# Patient Record
Sex: FEMALE | Race: BLACK OR AFRICAN AMERICAN
[De-identification: names, ages, dates, MRNs, and addresses within clinical notes are randomized per-mention and may not be internally consistent; named-entity substitution may affect disease eponyms.]

---

## 2017-05-20 ENCOUNTER — HOSPITAL ENCOUNTER (EMERGENCY)
Dept: HOSPITAL 74 - JER | Age: 62
Discharge: HOME | End: 2017-05-20
Payer: COMMERCIAL

## 2017-05-20 VITALS — TEMPERATURE: 97.6 F | HEART RATE: 64 BPM | DIASTOLIC BLOOD PRESSURE: 70 MMHG | SYSTOLIC BLOOD PRESSURE: 153 MMHG

## 2017-05-20 VITALS — BODY MASS INDEX: 37.5 KG/M2

## 2017-05-20 DIAGNOSIS — E78.00: ICD-10-CM

## 2017-05-20 DIAGNOSIS — Z87.442: ICD-10-CM

## 2017-05-20 DIAGNOSIS — I10: ICD-10-CM

## 2017-05-20 DIAGNOSIS — R10.31: Primary | ICD-10-CM

## 2017-05-20 LAB
ANION GAP SERPL CALC-SCNC: 10 MMOL/L (ref 8–16)
APPEARANCE UR: CLEAR
BASOPHILS # BLD: 0.7 % (ref 0–2)
BILIRUB UR STRIP.AUTO-MCNC: NEGATIVE MG/DL
CALCIUM SERPL-MCNC: 9.4 MG/DL (ref 8.5–10.1)
CO2 SERPL-SCNC: 32 MMOL/L (ref 21–32)
COCKROFT - GAULT: 123.21
COLOR UR: (no result)
CREAT SERPL-MCNC: 0.8 MG/DL (ref 0.55–1.02)
DEPRECATED RDW RBC AUTO: 16 % (ref 11.6–15.6)
EOSINOPHIL # BLD: 2.3 % (ref 0–4.5)
GLUCOSE SERPL-MCNC: 84 MG/DL (ref 74–106)
KETONES UR QL STRIP: (no result)
LEUKOCYTE ESTERASE UR QL STRIP.AUTO: NEGATIVE
MAGNESIUM SERPL-MCNC: 2.2 MG/DL (ref 1.8–2.4)
MCH RBC QN AUTO: 25.2 PG (ref 25.7–33.7)
MCHC RBC AUTO-ENTMCNC: 32.3 G/DL (ref 32–36)
MCV RBC: 78.3 FL (ref 80–96)
NEUTROPHILS # BLD: 53.9 % (ref 42.8–82.8)
NITRITE UR QL STRIP: NEGATIVE
PH UR: 7 [PH] (ref 5–8)
PHOSPHATE SERPL-MCNC: 2.6 MG/DL (ref 2.5–4.9)
PLATELET # BLD AUTO: 313 K/MM3 (ref 134–434)
PMV BLD: 7 FL (ref 7.5–11.1)
PROT UR QL STRIP: NEGATIVE
PROT UR QL STRIP: NEGATIVE
RBC # BLD AUTO: 2 /HPF (ref 0–3)
RBC # UR STRIP: (no result) /UL
SP GR UR: 1.01 (ref 1–1.02)
UROBILINOGEN UR STRIP-MCNC: NEGATIVE E.U./DL (ref 0.2–1)
WBC # BLD AUTO: 7 K/MM3 (ref 4–10)
WBC # UR AUTO: <1 /HPF (ref 3–5)

## 2017-05-20 PROCEDURE — 3E0333Z INTRODUCTION OF ANTI-INFLAMMATORY INTO PERIPHERAL VEIN, PERCUTANEOUS APPROACH: ICD-10-PCS | Performed by: EMERGENCY MEDICINE

## 2017-05-20 NOTE — PDOC
History of Present Illness





- General


History Source: Patient


Exam Limitations: No Limitations





- History of Present Illness


Initial Comments: 





05/20/17 14:07


Patient is a 61 year old female with a significant past medical history of 

kidney stones, hypertension and hyperlipidemia who presents to the ED with 

right flank pain since yesterday. Patient states that the pain initially 

started with diffuse back pain with sudden onset and today it is localized at 

the right flank and that is constant. Patient describes the pain as sharp while 

she is ambulating and dull while sitting down. She reports nausea. 


She denies any vomiting, diarrhea, constipation, hematuria, dysuria or 

abdominal pain. 


Patient denies any recent fall or trauma. 














<Perla Fry - Last Filed: 05/20/17 14:07>





- General


History Source: Patient, Old Records


Exam Limitations: No Limitations





<Mary Davenport - Last Filed: 05/20/17 16:30>





- General


Chief Complaint: Pain


Stated Complaint: BACK/ RT SIDE PAIN


Time Seen by Provider: 05/20/17 13:36





Past History





<Perla Fry - Last Filed: 05/20/17 14:07>





- Past Medical History


Anemia: No


Asthma: No


Cancer: No


Cardiac Disorders: No


CVA: No


COPD: No


CHF: No


Dementia: No


Diabetes: No


GI Disorders: No


 Disorders: No


HTN: Yes


Hypercholesterolemia: Yes


Liver Disease: No


Seizures: No


Thyroid Disease: No





- Surgical History


Abdominal Surgery: No


Appendectomy: No


Cardiac Surgery: No


Cholecystectomy: Yes


Lung Surgery: No


Neurologic Surgery: No


Orthopedic Surgery: Yes (FX LEFT ANKLE)





- Psycho/Social/Smoking Cessation Hx


Anxiety: No


Suicidal Ideation: No


Smoking History: Never smoked


Have you smoked in the past 12 months: No


Information on smoking cessation initiated: No


Hx Alcohol Use: No


Drug/Substance Use Hx: No


Substance Use Type: None


Hx Substance Use Treatment: No





<Mary Davenport - Last Filed: 05/20/17 16:30>





- Past Medical History


Allergies/Adverse Reactions: 


 Allergies











Allergy/AdvReac Type Severity Reaction Status Date / Time


 


latex AdvReac  Itching Verified 05/20/17 12:40











Home Medications: 


Ambulatory Orders





Amlodipine Besylate [Norvasc -] 10 mg PO DAILY 08/12/14 


Valsartan/Hydrochlorothiazide [Diovan Hct 160-25 mg Tablet] 1 each PO DAILY 08/ 12/14 











**Review of Systems





- Review of Systems


Able to Perform ROS?: Yes


Comments:: 





05/20/17 14:07


GENERAL/CONSTITUTIONAL: No fever or chills. No weakness.


HEAD, EYES, EARS, NOSE AND THROAT: No change in vision. No ear pain or 

discharge. No sore throat.


CARDIOVASCULAR: No chest pain or shortness of breath.


RESPIRATORY: No cough, wheezing, or hemoptysis.


GASTROINTESTINAL: No nausea, vomiting, diarrhea or constipation.


GENITOURINARY: No dysuria, frequency, or change in urination.


MUSCULOSKELETAL: (+)right flank pain No joint or muscle swelling or pain. No 

neck.


SKIN: No rash


NEUROLOGIC: No headache, vertigo, loss of consciousness, or change in strength/

sensation.


ENDOCRINE: No increased thirst. No abnormal weight change.


HEMATOLOGIC/LYMPHATIC: No anemia, easy bleeding, or history of blood clots.


ALLERGIC/IMMUNOLOGIC: No hives or skin allergy.





<Perla Fry - Last Filed: 05/20/17 14:07>





*Physical Exam





- Vital Signs


 Last Vital Signs











Temp Pulse Resp BP Pulse Ox


 


 97.6 F   64   18   153/70   99 


 


 05/20/17 12:40  05/20/17 12:40  05/20/17 12:40  05/20/17 12:40  05/20/17 12:40














- Physical Exam


Comments: 





05/20/17 14:10


GENERAL: Awake, alert, and fully oriented, in no acute distress


HEAD: No signs of trauma


EYES: PERRLA, EOMI, sclera anicteric, conjunctiva clear


ENT: Auricles normal inspection, hearing grossly normal, nares patent, 

oropharynx clear without exudates. Moist mucosa


NECK: Normal ROM, supple, no lymphadenopathy, JVD, or masses


LUNGS: Breath sounds equal, clear to auscultation bilaterally.  No wheezes, and 

no crackles


HEART: Regular rate and rhythm, normal S1 and S2, no murmurs, rubs or gallops


ABDOMEN: Soft, nontender, normoactive bowel sounds.  No guarding, no rebound.  

No masses


EXTREMITIES: Normal range of motion, no edema.  No clubbing or cyanosis. No 

cords, erythema, or tenderness


NEUROLOGICAL: Cranial nerves II through XII grossly intact.  Normal speech, 

normal gait


SKIN: Warm, Dry, normal turgor, no rashes or lesions noted.








<Perla Fry - Last Filed: 05/20/17 14:07>





- Vital Signs


 Last Vital Signs











Temp Pulse Resp BP Pulse Ox


 


 97.6 F   64   18   153/70   99 


 


 05/20/17 12:40  05/20/17 12:40  05/20/17 12:40  05/20/17 12:40  05/20/17 12:40














<Mary Davenport - Last Filed: 05/20/17 16:30>





ED Treatment Course





- LABORATORY


CBC & Chemistry Diagram: 


 05/20/17 15:00





 05/20/17 15:00





<Mary Davenport - Last Filed: 05/20/17 16:30>





Medical Decision Making





- Medical Decision Making


05/20/17 13:54


61-year-old female with history of hypertension and nephrolithiasis presents to 

the emergency Department with complaints of right flank pain since yesterday 

accompanied with nausea but no vomiting. Differential diagnosis includes but is 

not limited to: Pyelonephritis, nephrolithiasis/renal colic, musculoskeletal 

pain, UTI.


Plan:


1. Labs and urine analysis


2. Pain management


3. IV fluids for hydration


4. CT scan of abdomen and pelvis without contrast


5. Observe and reevaluate





05/20/17 16:23


Addendum: Labs are reviewed and are noted in the EMR. The urine analysis is 

negative. CT scan of the abdomen and pelvis shows no acute intra-abdominal 

pathology or pelvic pathology as well as no stone. I reevaluated the patient at 

this time and she is feeling improved. We'll discharge home, follow up with 

primary care physician and return to the emergency department if symptoms 

persist, worsen, or new symptoms arise.





<Mary Davenport - Last Filed: 05/20/17 16:30>





*DC/Admit/Observation/Transfer





- Attestations


Scribe Attestion: 





05/20/17 14:11





Documentation prepared by ALBER Akers, acting as medical scribe for 

Mary Davenport MD.





<Perla Fry - Last Filed: 05/20/17 14:07>





- Discharge Dispostion


Admit: No





- Attestations


Physician Attestion: 





05/20/17 13:55


I, Dr. Mary Murano, attest that the scribes documentation that appears above 

has been prepared under my direction and personally reviewed by me in its 

entirety.





I confirmed that the note above accurately reflects all work, treatment, 

procedures, and medical decision-making performed by me.





<Mary Davenport - Last Filed: 05/20/17 16:30>


Diagnosis at time of Disposition: 


 Right flank pain





- Discharge Dispostion


Disposition: HOME


Condition at time of disposition: Stable





- Referrals


Referrals: 


STAFF,NOT ON [Primary Care Provider] - 





- Patient Instructions


Printed Discharge Instructions:  DI for Low Back Pain


Additional Instructions: 


You may take acetaminophen or ibuprofen for your back pain. Please follow-up 

with your primary care physician within the next 3-5 days and return to the 

emergency department if your symptoms persist, worsen, or new symptoms arise.

## 2017-05-22 ENCOUNTER — HOSPITAL ENCOUNTER (EMERGENCY)
Dept: HOSPITAL 74 - JER | Age: 62
Discharge: HOME | End: 2017-05-22
Payer: COMMERCIAL

## 2017-05-22 VITALS — SYSTOLIC BLOOD PRESSURE: 126 MMHG | DIASTOLIC BLOOD PRESSURE: 72 MMHG | HEART RATE: 58 BPM

## 2017-05-22 VITALS — BODY MASS INDEX: 37.5 KG/M2

## 2017-05-22 VITALS — TEMPERATURE: 98.1 F

## 2017-05-22 DIAGNOSIS — I10: ICD-10-CM

## 2017-05-22 DIAGNOSIS — N30.00: ICD-10-CM

## 2017-05-22 DIAGNOSIS — M94.0: Primary | ICD-10-CM

## 2017-05-22 DIAGNOSIS — E78.00: ICD-10-CM

## 2017-05-22 LAB
ALBUMIN SERPL-MCNC: 4.1 G/DL (ref 3.4–5)
ALP SERPL-CCNC: 94 U/L (ref 45–117)
ALT SERPL-CCNC: 18 U/L (ref 12–78)
ANION GAP SERPL CALC-SCNC: 7 MMOL/L (ref 8–16)
APPEARANCE UR: CLEAR
AST SERPL-CCNC: 17 U/L (ref 15–37)
BACTERIA #/AREA URNS HPF: (no result) /HPF
BASOPHILS # BLD: 0.3 % (ref 0–2)
BILIRUB SERPL-MCNC: 0.4 MG/DL (ref 0.2–1)
BILIRUB UR STRIP.AUTO-MCNC: NEGATIVE MG/DL
CALCIUM SERPL-MCNC: 9.8 MG/DL (ref 8.5–10.1)
CO2 SERPL-SCNC: 32 MMOL/L (ref 21–32)
COCKROFT - GAULT: 98.57
COLOR UR: YELLOW
CREAT SERPL-MCNC: 1 MG/DL (ref 0.55–1.02)
DEPRECATED RDW RBC AUTO: 16.5 % (ref 11.6–15.6)
EOSINOPHIL # BLD: 2.5 % (ref 0–4.5)
GLUCOSE SERPL-MCNC: 95 MG/DL (ref 74–106)
KETONES UR QL STRIP: NEGATIVE
LEUKOCYTE ESTERASE UR QL STRIP.AUTO: (no result)
MCH RBC QN AUTO: 25.1 PG (ref 25.7–33.7)
MCHC RBC AUTO-ENTMCNC: 31.9 G/DL (ref 32–36)
MCV RBC: 78.7 FL (ref 80–96)
MUCOUS THREADS URNS QL MICRO: (no result)
NEUTROPHILS # BLD: 56.9 % (ref 42.8–82.8)
NITRITE UR QL STRIP: NEGATIVE
PH UR: 5 [PH] (ref 5–8)
PLATELET # BLD AUTO: 322 K/MM3 (ref 134–434)
PMV BLD: 6.8 FL (ref 7.5–11.1)
PROT SERPL-MCNC: 8.2 G/DL (ref 6.4–8.2)
PROT UR QL STRIP: NEGATIVE
PROT UR QL STRIP: NEGATIVE
RBC # BLD AUTO: 3 /HPF (ref 0–3)
RBC # UR STRIP: (no result) /UL
SP GR UR: 1.02 (ref 1–1.02)
UROBILINOGEN UR STRIP-MCNC: NEGATIVE E.U./DL (ref 0.2–1)
WBC # BLD AUTO: 6.3 K/MM3 (ref 4–10)
WBC # UR AUTO: 13 /HPF (ref 3–5)

## 2017-05-22 NOTE — PDOC
History of Present Illness





- General


Chief Complaint: Pain


Stated Complaint: RT SIDE PAIN


Time Seen by Provider: 05/22/17 12:00


History Source: Patient


Exam Limitations: No Limitations





- History of Present Illness


Initial Comments: 


CHIEF COMPLAINT:  62 y/o afebrile female with PMH HTN, HLD c/o right upper back 

pain for the past 2 days. 





HISTORY OF PRESENT ILLNESS:  The patient was seen here 2 days ago for the same 

pain, had labs/UA/CT scan performed and all were normal.  She was discharged 

home with dx of musculoskeletal pain and instructed to take tylenol at home.  

The patient states the pain has gotten worse and is now constant and she is 

also nauseous.  She denies f/c, v/d, CP, SOB, abd pain, flank pain, hematuria, 

dysuria. 





Vital signs on arrival are notable for pulse of 52.





REVIEW OF SYSTEMS:


GENERAL/CONSTITUTIONAL: No fever/chills. No weakness. No weight change.


HEAD, EYES, EARS, NOSE AND THROAT: No change in vision. No ear pain or 

discharge. No sore throat.


CARDIOVASCULAR: No chest pain or shortness of breath.


RESPIRATORY: No cough, wheezing, or hemoptysis.


GASTROINTESTINAL: +nausea.  No vomiting, diarrhea, abd pain.


GENITOURINARY: No dysuria, frequency, or change in urination.


MUSCULOSKELETAL: No joint or muscle swelling or pain. No neck or back pain.


SKIN: No rash or easy bruising.


NEUROLOGIC: No headache, vertigo, loss of consciousness, or loss of sensation.





PHYSICAL EXAM:


GENERAL: The patient is awake, alert, and fully oriented, in no acute distress.

  She is ambulatory with a cane. 


HEAD: Normal with no signs of trauma.


ENT: Pupils equal, round and reactive to light, extraocular movements intact, 

sclera anicteric, conjunctiva clear. Neck supple.


LUNGS: Clear to auscultation bilaterally. Normal excursion. No respiratory 

distress or use of accessory muscles.


CHEST WALL:  Tenderness to palpation of right floating ribs.


CV: RRR, S1/S2, no MRG. Cap refill < 2 sec.


ABDOMEN: Soft, non-distended, non-tender even to deep palpation, no 

hepatomegaly or splenomegaly, no masses.


BACK:  Exquisite right CVA tenderness with fist percussion.  No left CVA ttp. 


EXTREMITIES: Normal range of motion, no edema.


NEUROLOGICAL: Normal speech, normal gait. CN II-XII grossly intact.


PSYCH: Normal mood, normal affect.


SKIN: Warm, dry, normal turgor, no rashes or lesions noted.














Past History





- Past Medical History


Allergies/Adverse Reactions: 


 Allergies











Allergy/AdvReac Type Severity Reaction Status Date / Time


 


latex AdvReac  Itching Verified 05/22/17 10:42











Home Medications: 


Ambulatory Orders





Amlodipine Besylate [Norvasc -] 10 mg PO DAILY 08/12/14 


Valsartan/Hydrochlorothiazide [Diovan Hct 160-25 mg Tablet] 1 each PO DAILY 08/ 12/14 


Cephalexin Monohydrate [Keflex -] 500 mg PO BID #10 capsule 05/22/17 


Tramadol HCl 50 mg PO Q6H #20 tablet MDD 5 05/22/17 








Anemia: No


Asthma: No


Cancer: No


Cardiac Disorders: No


CVA: No


COPD: No


CHF: No


Dementia: No


Diabetes: No


GI Disorders: No


 Disorders: No


HTN: Yes


Hypercholesterolemia: Yes


Liver Disease: No


Seizures: No


Thyroid Disease: No





- Surgical History


Abdominal Surgery: No


Appendectomy: No


Cardiac Surgery: No


Cholecystectomy: Yes


Lung Surgery: No


Neurologic Surgery: No


Orthopedic Surgery: Yes (FX LEFT ANKLE)





- Psycho/Social/Smoking Cessation Hx


Anxiety: No


Suicidal Ideation: No


Smoking History: Never smoked


Have you smoked in the past 12 months: No


Information on smoking cessation initiated: No


Hx Alcohol Use: No


Drug/Substance Use Hx: No


Substance Use Type: None


Hx Substance Use Treatment: No





*Physical Exam





- Vital Signs


 Last Vital Signs











Temp Pulse Resp BP Pulse Ox


 


 98.1 F   52 L  18   129/76   100 


 


 05/22/17 10:42  05/22/17 10:42  05/22/17 10:42  05/22/17 10:42  05/22/17 10:42














ED Treatment Course





- LABORATORY


CBC & Chemistry Diagram: 


 05/22/17 12:10





 05/22/17 12:10





Medical Decision Making





- Medical Decision Making


A/P:  62 y/o afebrile female with possible pyelo.  Plan is as follows:





1. Labs


2. UA/culture


3. Kidney/renal ultrasound


4. IM toradol








Kidney/renal ultrasound


IMPRESSION:  No evidence of hydronephrosis or acute kidney issue.





Gave patient all of her results.  At this point most likely costochondral pain 

and UTI.  Will send home with rx for Keflex and Tramadol.  INstructed the 

patient to take advil along with the tramadol, drink plenty of fluids and 

return to the ER in 48-72 hours if no improvement in symptoms. 





The patient verbalizes understanding of all instructions, has no further 

questions and is awaiting discharge.








*DC/Admit/Observation/Transfer


Diagnosis at time of Disposition: 


 Costochondral pain





UTI (urinary tract infection)


Qualifiers:


 Urinary tract infection type: acute cystitis Hematuria presence: without 

hematuria Qualified Code(s): N30.00 - Acute cystitis without hematuria





- Discharge Dispostion


Disposition: HOME


Condition at time of disposition: Stable





- Prescriptions


Prescriptions: 


Cephalexin Monohydrate [Keflex -] 500 mg PO BID #10 capsule


Tramadol HCl 50 mg PO Q6H #20 tablet MDD 5





- Referrals


Referrals: 


STAFF,NOT ON [Primary Care Provider] - 


Silvino Najera MD [Staff Physician] - 





- Patient Instructions


Printed Discharge Instructions:  DI for Costochondritis, DI for Urinary Tract 

Infection (UTI)


Additional Instructions: 


Discharge Instructions:


-Please take 2 prescriptions as prescribed; Tramadol may cause drowsiness


-Take Tramadol along with Motrin every 6 hours for pain


-Drink plenty of fluids


-Follow up with your doctor or Dr. Najera within 2 weeks


-Return to the ER with any worsening or concerning symptom





- Post Discharge Activity


Work/School Note:  Back to Work

## 2017-05-22 NOTE — PDOC
*Physical Exam





- Vital Signs


 Last Vital Signs











Temp Pulse Resp BP Pulse Ox


 


 98.1 F   52 L  18   129/76   100 


 


 05/22/17 10:42  05/22/17 10:42  05/22/17 10:42  05/22/17 10:42  05/22/17 10:42














- Physical Exam


Comments: 





05/22/17 14:19


Vital signs normal.


Overall well-appearing, speaking full sentences


Exam is normal except for isolated and reproducible tenderness along the right 

lower floating ribs, most prominent near the cartilage, no overlying rash or 

bruising or swelling


Lungs are clear, no CVA tenderness, abdomen is benign





ED Treatment Course





- LABORATORY


CBC & Chemistry Diagram: 


 05/22/17 12:10





 05/22/17 12:10





- ADDITIONAL ORDERS


Additional order review: 


 Laboratory  Results











  05/22/17 05/22/17





  12:10 12:10


 


Sodium  141 


 


Potassium  3.5 


 


Chloride  102 


 


Carbon Dioxide  32 


 


Anion Gap  7 L 


 


BUN  18  D 


 


Creatinine  1.0  D 


 


Creat Clearance w eGFR  56.37 


 


Random Glucose  95 


 


Calcium  9.8 


 


Total Bilirubin  0.4  D 


 


AST  17 


 


ALT  18 


 


Alkaline Phosphatase  94 


 


Total Protein  8.2  D 


 


Albumin  4.1  D 


 


Urine Color   Yellow


 


Urine Appearance   Clear


 


Urine pH   5.0  D


 


Urine Protein   Negative


 


Urine Glucose (UA)   Negative


 


Urine Ketones   Negative


 


Urine Blood   1+ H


 


Urine Nitrite   Negative


 


Urine Bilirubin   Negative


 


Urine Urobilinogen   Negative


 


Ur Leukocyte Esterase   1+ H


 


Urine RBC   3


 


Urine WBC   13


 


Ur Epithelial Cells   Rare


 


Urine Bacteria   Moderate


 


Urine Mucus   Few








 











  05/22/17





  12:10


 


RBC  4.81


 


MCV  78.7 L


 


MCHC  31.9 L


 


RDW  16.5 H


 


MPV  6.8 L


 


Neutrophils %  56.9


 


Lymphocytes %  34.5


 


Monocytes %  5.8


 


Eosinophils %  2.5


 


Basophils %  0.3














- Medications


Given in the ED: 


ED Medications














Discontinued Medications














Generic Name Dose Route Start Last Admin





  Trade Name Freq  PRN Reason Stop Dose Admin


 


Ketorolac Tromethamine  60 mg 05/22/17 12:08 05/22/17 12:31





  Toradol Injection -  IVPUSH 05/22/17 12:09  60 mg





  ONCE ONE   Administration














Medical Decision Making





- Medical Decision Making





05/22/17 14:19


Patient seen and evaluated with the nurse practitioner. I agree with the 

overall evaluation, assessment, and management with the following summary of 

visit:





61-year-old female with no severe past medical history presents for second 

visit in 3 days for persistent right sided pain. Prior workup with labs and 

urinalysis and CAT scan showed no acute pathology, workup today showed normal 

labs, normal renal ultrasound, but elevated white blood cells on the urinalysis.





Overall her presentation is not consistent with UTI or pyelonephritis, but 

given the elevated white count on the urinalysis will treat empirically as it 

pyelonephritis. In the absence of fever and leukocytosis, chemistries treated 

with outpatient trial. Clinically, the patient has costochondral pain that 

seems more musculoskeletal, Will trial course of tramadol with strict return 

precautions. She has no risk factors for PE, no signs or symptoms of PE, 

clinically rules out.





*DC/Admit/Observation/Transfer


Diagnosis at time of Disposition: 


 Costochondral pain





UTI (urinary tract infection)


Qualifiers:


 Urinary tract infection type: acute cystitis Hematuria presence: without 

hematuria Qualified Code(s): N30.00 - Acute cystitis without hematuria





- Discharge Dispostion


Disposition: HOME


Condition at time of disposition: Stable





- Prescriptions


Prescriptions: 


Cephalexin Monohydrate [Keflex -] 500 mg PO BID #10 capsule


Tramadol HCl 50 mg PO Q6H PRN #20 tablet MDD 4


 PRN Reason: Pain





- Referrals


Referrals: 


STAFF,NOT ON [Primary Care Provider] - 


Silvino Najera MD [Staff Physician] - 





- Patient Instructions


Printed Discharge Instructions:  DI for Urinary Tract Infection (UTI), DI for 

Costochondritis


Additional Instructions: 


Discharge Instructions:


-Please take 2 prescriptions as prescribed; Tramadol may cause drowsiness


-Take Tramadol along with Motrin every 6 hours for pain


-Drink plenty of fluids


-Follow up with your doctor or Dr. Najera within 2 weeks


-Return to the ER with any worsening or concerning symptom





- Post Discharge Activity


Work/School Note:  Back to Work

## 2018-01-17 ENCOUNTER — HOSPITAL ENCOUNTER (OUTPATIENT)
Dept: HOSPITAL 74 - JASU-SURG | Age: 63
Discharge: HOME | End: 2018-01-17
Attending: ORTHOPAEDIC SURGERY
Payer: COMMERCIAL

## 2018-01-17 VITALS — DIASTOLIC BLOOD PRESSURE: 88 MMHG | HEART RATE: 59 BPM | SYSTOLIC BLOOD PRESSURE: 139 MMHG

## 2018-01-17 VITALS — TEMPERATURE: 97.5 F

## 2018-01-17 VITALS — BODY MASS INDEX: 37 KG/M2

## 2018-01-17 DIAGNOSIS — D18.09: ICD-10-CM

## 2018-01-17 DIAGNOSIS — M65.312: Primary | ICD-10-CM

## 2018-01-17 LAB
APTT BLD: 28.3 SECONDS (ref 26.9–34.4)
INR BLD: 1 (ref 0.82–1.09)
PT PNL PPP: 11.3 SEC (ref 9.98–11.88)

## 2018-01-17 PROCEDURE — 0JBK0ZZ EXCISION OF LEFT HAND SUBCUTANEOUS TISSUE AND FASCIA, OPEN APPROACH: ICD-10-PCS | Performed by: ORTHOPAEDIC SURGERY

## 2018-01-17 PROCEDURE — 0XBK0ZZ EXCISION OF LEFT HAND, OPEN APPROACH: ICD-10-PCS | Performed by: ORTHOPAEDIC SURGERY

## 2018-01-17 PROCEDURE — 0LN80ZZ RELEASE LEFT HAND TENDON, OPEN APPROACH: ICD-10-PCS | Performed by: ORTHOPAEDIC SURGERY

## 2018-01-17 NOTE — OP
DATE OF OPERATION:  01/17/2018 

 

PREOPERATIVE DIAGNOSIS:  Left trigger thumb and hand mass (different location).

 

POSTOPERATIVE DIAGNOSIS:  Left trigger thumb and hand mass (different location).

 

PROCEDURE:  

1.  Left hand mass excision.

2.  Left trigger thumb release.

3.  Tendon sheath excision.  

 

SURGEON:  Silvino Gifford MD 

 

ASSISTANT:  Yousif Welsh MD 

 

ANESTHETIST:  _____ CRNA

 

ANESTHESIA:  MAC anesthesia, local injection of 12 mL 0.5% Marcaine, 1% lidocaine

mix.

 

DRAINS:  None.

 

COMPLICATIONS:  None.  

 

SPECIMENS:  

1.  Left hand mass, palm.

2.  Left thumb tendon sheath.  

 

BLOOD LOSS:  None.

 

BLOOD GIVEN:  None.

 

FLUID REPLACEMENT:  500 mL 

 

INDICATION FOR SURGERY:  This patient is a 62-year-old female with a preoperative

diagnosis of a left trigger thumb and recurrently painful hand mass in the palm. 

After understanding the potential, risks, complications, alternatives, benefits of

surgery versus nonsurgical treatment, the patient elected to undergo this procedure. 

 

 

DESCRIPTION OF PROCEDURE:  Patient was brought to the operating room, peripheral IV

placed, IV sedation given.  Ancef 1 g IV was given.  MAC anesthesia was induced. 

Left upper extremity was prepped and draped in sterile fashion.  Two incisions were

marked out with a marking pen, one transversely within an already existing skin

crease at the base of the left thumb for the trigger thumb release, a separate

incision longitudinally in the palm near the base of the thenar eminence over a

painful mass.  The arm was then elevated, exsanguinated with an Esmarch bandage, the

tourniquet infiltrated to 250 mmHg.  

 

The left trigger thumb release was done in the standard fashion.  A transverse

incision was made with a No. 15 scalpel blade.  Subcutaneous hemostasis was achieved

with a bipolar cautery.  Great care was taken to identify and protect the

neurovascular bundles.  The patient had a lot of excessive fat and what looked like a

small multiloculated ganglion cyst on the volar aspect of the left A1 pulley sheath

area, therefore this was excised.  The No. 15 scalpel blade was utilized to cut down

through the tendon sheath.  This opened up the A1 pulley sheath.  The central 1 mm

was excised and passed off the field as part of the same specimen.  The distal and

the proximal _____ were checked, the Ragnell retractor used to bring the SPL tendon

up through the wound.  It looked good.  The area was copiously irrigated and washed

out.  The skin was closed with a 4-0 undyed Vicryl in the deep dermal layer, and

final skin reapproximation was done with horizontal mattress 4-0 nylon sutures.  

 

Next our attention turned to the palm.  A longitudinal incision was made with a No.

15 scalpel blade.  Subcutaneous hemostasis was achieved with a bipolar cautery. 

Subcutaneously there was an obvious mass.  It was purple, blood filled, and looked to

be either a blood-filled cyst/retinacular cyst, a thrombosed vein, or perhaps a

glomus tumor.  Either way, it was excised.  It was well circumscribed.  It was quite

small, perhaps 1.5 mm in diameter.  It was self-contained.  There was no stalk.  The

area was cauterized, irrigated, washed out, skin closed with 4-0 undyed Vicryl. 

Final skin reapproximation done with horizontal mattress 4-0 nylon sutures.  The area

was then washed and dried, covered with Xeroform, 4 x 4 gauze, fluffs between the

fingers, Webril, and latex-free Ace bandage.  The tourniquet was taken down after

total tourniquet time of 18 minutes.  There were no complications during the case. 

Patient tolerated the procedure quite well and was brought to ambulatory recovery in

stable condition.  

 

 

YOUSIF WELSH M.D.

 

[id number]

DD: 01/17/2018 10:05

DT: 01/17/2018 11:01

Job #:  [id number]

## 2018-01-17 NOTE — HP
[facility]





- Chief Complaint


Chief Complaint: left thumb pain


History of Present Illness: left trigger thumb


History Source: Patient


Limitations to Obtaining History: No Limitations





- Past Medical History


Allergies/Adverse Reactions: 


 Allergies











Allergy/AdvReac Type Severity Reaction Status Date / Time


 


latex AdvReac  Itching Verified 05/22/17 10:42











Cardiovascular: Yes: HTN





- Current Medications


Current Medications: 


 Home Medications











 Medication  Instructions  Recorded


 


Amlodipine Besylate [Norvasc -] 10 mg PO DAILY 08/12/14


 


Valsartan/Hydrochlorothiazide 1 each PO DAILY 08/12/14





[Diovan Hct 160-25 mg Tablet]  














Satellite Physical Exam





- Physical Examination


General Appearance: Well Nourished


ENT: Clear


Lung: Clear to auscultation


Heart: Regular rate & rhythm


Breasts: Soft


Abdomen: Soft


Extremities: No edema





Satellite Impression/Plan





- Impression/Plan


Impression: left trigger thumb


Operative Procedure: left trigger thumb release


Date to be Performed: 01/17/18

## 2018-01-17 NOTE — OP
Operative Note





- Note:


Operative Date: 01/17/18


Pre-Operative Diagnosis: left hand mass, trigger thumb


Operation: left hand mass excision, left trigger thumb release, tendon sheath 

excision


Post-Operative Diagnosis: Same as Pre-op


Surgeon: Silvino Gifford


Assistant: Yousif Berkowitz


Anesthesiologist/CRNA: Bloch,Marisa


Anesthesia: Local, MAC


Specimens Removed: 1. tendon sheath 2. hand mass


Estimated Blood Loss (mls): 0


Drains, Volume Out (mls): 0


Blood Volume Replaced (mls): 0


Fluid Volume Replaced (mls): 500


Operative Report Dictated: Yes

## 2018-01-19 NOTE — PATH
Surgical Pathology Report



Patient Name:  REMY TURPIN

Accession #:  

Med. Rec. #:  [id number]                                                        

   /Age/Gender:  1955 (Age: 62) / F

Account:  [id number]                                                          

             Location: [facility] SURG[facility]

Taken:  2018

Received:  2018

Reported:  2018

Physicians:  JEFRY Pacheco M.D.

  



Specimen(s) Received

A: LEFT THUMB TENDON SHEATH 

B: MASS OF LEFT HAND 





Clinical History

Left hand mass, left trigger finger







Final Diagnosis

A. THUMB, LEFT, TENDON SHEATH, EXCISION:

BENIGN DENSE FIBROCONNECTIVE TISSUE, FIBROADIPOSE TISSUE, AND PROMINENT NERVE

BUNDLES.



B. HAND, LEFT, MASS, EXCISION:

HEMANGIOMA.







***Electronically Signed***

Roxana Clayton M.D.





Gross Description

A. Received in formalin labeled "left thumb tendon sheath," is a 1.2 x 0.5 x 0.2

cm aggregate of tan-yellow soft tissue fragments. The formalin is filtered and

the specimen is entirely submitted in one cassette.



B. Received in formalin labeled "mass left hand," is a 0.5 x 0.3 x 0.2 cm tan

brown, irregular portion of soft tissue. The specimen is submitted in toto in

one cassette.

/2018

## 2020-03-14 ENCOUNTER — HOSPITAL ENCOUNTER (EMERGENCY)
Dept: HOSPITAL 74 - JER | Age: 65
Discharge: HOME | End: 2020-03-14
Payer: COMMERCIAL

## 2020-03-14 VITALS — HEART RATE: 68 BPM | TEMPERATURE: 98.3 F | DIASTOLIC BLOOD PRESSURE: 78 MMHG | SYSTOLIC BLOOD PRESSURE: 122 MMHG

## 2020-03-14 VITALS — BODY MASS INDEX: 37.1 KG/M2

## 2020-03-14 DIAGNOSIS — Z20.828: Primary | ICD-10-CM

## 2020-03-14 DIAGNOSIS — R07.9: ICD-10-CM

## 2020-03-14 LAB
ALBUMIN SERPL-MCNC: 3.7 G/DL (ref 3.4–5)
ALP SERPL-CCNC: 101 U/L (ref 45–117)
ALT SERPL-CCNC: 19 U/L (ref 13–61)
ANION GAP SERPL CALC-SCNC: 8 MMOL/L (ref 8–16)
AST SERPL-CCNC: 18 U/L (ref 15–37)
BASOPHILS # BLD: 1 % (ref 0–2)
BILIRUB SERPL-MCNC: 0.4 MG/DL (ref 0.2–1)
BUN SERPL-MCNC: 11.4 MG/DL (ref 7–18)
CALCIUM SERPL-MCNC: 9.4 MG/DL (ref 8.5–10.1)
CHLORIDE SERPL-SCNC: 103 MMOL/L (ref 98–107)
CO2 SERPL-SCNC: 28 MMOL/L (ref 21–32)
CREAT SERPL-MCNC: 0.7 MG/DL (ref 0.55–1.3)
DEPRECATED RDW RBC AUTO: 16.6 % (ref 11.6–15.6)
EOSINOPHIL # BLD: 5.4 % (ref 0–4.5)
GLUCOSE SERPL-MCNC: 87 MG/DL (ref 74–106)
HCT VFR BLD CALC: 38.7 % (ref 32.4–45.2)
HGB BLD-MCNC: 12.4 GM/DL (ref 10.7–15.3)
LYMPHOCYTES # BLD: 38.3 % (ref 8–40)
MCH RBC QN AUTO: 25.5 PG (ref 25.7–33.7)
MCHC RBC AUTO-ENTMCNC: 32 G/DL (ref 32–36)
MCV RBC: 79.5 FL (ref 80–96)
MONOCYTES # BLD AUTO: 5.8 % (ref 3.8–10.2)
NEUTROPHILS # BLD: 49.5 % (ref 42.8–82.8)
PLATELET # BLD AUTO: 358 K/MM3 (ref 134–434)
PMV BLD: 6.8 FL (ref 7.5–11.1)
POTASSIUM SERPLBLD-SCNC: 4 MMOL/L (ref 3.5–5.1)
PROT SERPL-MCNC: 8 G/DL (ref 6.4–8.2)
RBC # BLD AUTO: 4.86 M/MM3 (ref 3.6–5.2)
SODIUM SERPL-SCNC: 139 MMOL/L (ref 136–145)
WBC # BLD AUTO: 7.4 K/MM3 (ref 4–10)

## 2020-03-14 PROCEDURE — U0002 COVID-19 LAB TEST NON-CDC: HCPCS

## 2020-03-14 NOTE — PDOC
Documentation entered by Randee Long SCRIBE, acting as scribe for Jess Mallory MD.








Jess Mallory MD:  This documentation has been prepared by the scribe, 

Randee Long SCRIBE, under my direction and personally reviewed by me in its 

entirety.  I confirm that the documentation accurately reflects all work, 

treatment, procedures, and medical decision making performed by me.  





Attending Attestation





- Resident


Resident Name: Olman Cox





- ED Attending Attestation


I have performed the following: I have examined & evaluated the patient, The 

case was reviewed & discussed with the resident, I agree w/resident's findings &

plan





- HPI


HPI: 


03/14/20 16:04


Ms. Griffin is a 64 year old female with past medical history significant for 

HTN and DM who presents to the emergency department with chest pain that's been 

ongoing for the past week, that's worse in the morning, with improvement noted 

throughout the day. 


+mild nonproductive cough. 


SOB today with walking, but otherwise no distress as pt was anxious walking in.


not short of breath at rest. Denies fever or chills. The patient reports sick 

contact with a friend who tested positive for COVID-19. 





03/14/20 16:43








- Physicial Exam


PE: 


03/14/20 16:04


Agree with the resident's HPI and PE as documented in the electronic medical 

record.





GENERAL:


Well developed, well nourished. Awake and alert. No acute distress.


HEENT:


Normocephalic, atraumatic. PERRLA, EOMI. No conjunctival pallor. Sclera are non-

icteric. Moist mucous membranes. Oropharynx is clear.


NECK: 


Supple. Full ROM. 


CARDIOVASCULAR:


Regular rate and rhythm. Distal pulses are 2+ and symmetric. 


PULMONARY: 


No evidence of respiratory distress. Lungs clear to auscultation bilaterally. No

wheezing, rales or rhonchi.


ABDOMINAL:


Soft. Non-tender. Non-distended. No rebound or guarding. 


MUSCULOSKELETAL 


Normal range of motion at all joints. 


EXTREMITIES: 


No edema. No calf tenderness.


SKIN: 


Warm and dry. Normal capillary refill.


NEUROLOGICAL: 


Alert, awake, appropriate. Normal speech. 


PSYCHIATRIC: 


Cooperative. Good eye contact. Appropriate mood and affect.





03/14/20 16:42





03/15/20 07:23








- Medical Decision Making





03/14/20 16:26


Vital Signs











Temp Pulse Resp BP Pulse Ox


 


 97.9 F   61   18   138/69   98 


 


 03/14/20 14:43  03/14/20 14:43  03/14/20 14:43  03/14/20 14:43  03/14/20 14:43





DDx chest pain: ACS, coronary vasospasm, NSTEMI, arrhythmia, unstable angina, 

PE, dissection, PUD, esophageal spasm, GERD, gastritis, costochondritis, 

pneumonia, pleurisy, pericarditis/myocarditis. dehydration, 

electrolyte/metabolic derangements. 


Considered but clinically doubt based on HPI and PE: Low suspicion for ao  

dissection. 





EKG normal sinus rhythm, no interval abnormalities, narrow QRS, ST and T wave 

segments and morphology normal. Nonspecific T wave abnormalities, unchanged from

prior


Chest pain HEART score 3 which denotes


Low risk and probability for ACS, less than 1.7% risk for MACE at 4-6 wks





2 trops, dimer to eval for pe, low risk for PE but with dyspnea and pleuritic 

cp, can d dimer to eval for PE


viral panel sent, pending coronavirus given exposure with close contact <14 days

wth mild sx.


s/o to dr Thornton pending workup, cxr








03/15/20 07:26








**Heart Score/ECG Review





- History


History: Slightly suspicious





- Electrocardiogram


EKG: Non specific repolarization disturbance





- Age


Age: 45-65





- Risk Factors


Risk Factors Heart Score: Yes Hx Hypertension, Yes Hx Diabetes


Based on the list above the patient has:: 1-2 risk factors





- Troponin


Troponin: </= normal limit





- Score


Heart Score - Total: 3


  ** #1


ECG reviewed & interpreted by me at: 14:40


General ECG Interpretation: Sinus Rhythm, Normal Intervals





03/14/20 16:22


EKG sinus rhythm at 57 bpm, no interval abnormalities, narrow QRS, ST and T wave

segments and morphology normal. Nonspecific T wave abnormalities 





Discharge





- Discharge Information


Problems reviewed: Yes


Clinical Impression/Diagnosis: 


 Chest pain





Condition: Good


Disposition: HOME





- Follow up/Referral


Referrals: 


Caterina Carrizales MD [Primary Care Provider] - 


Gabo Stark MD [Staff Physician] - 





- Patient Discharge Instructions


Patient Printed Discharge Instructions:  DI for Atypical Chest Pain, DI for 

Chest Pain


Additional Instructions: 


Your EKG, labs, chest x-ray were within normal limits. 





Because you have been in contact with someone with known COVID-19, we encourage 

you to self quarantine for 14 days since the contact for the safety of yourself 

and those around you.





Please make a follow up appointment with a cardiologist (referral provided 

here).





If you experience any new, worsening, or concerning symptoms, including severe 

chest pain, nausea, vomiting, shortness of breath, cough, fever, or any other 

concerns, please return to the emergency room. 





- Post Discharge Activity

## 2020-03-14 NOTE — PDOC
History of Present Illness





- General


Chief Complaint: Chest Pain


Stated Complaint: CHEST PAIN


Time Seen by Provider: 03/14/20 15:28





- History of Present Illness


Initial Comments: 








Patient's Cell Phone: [phone]





Carolyn Griffin is a 63 y/o female with reported PMH significant HTN, DM, 

presenting with chest pain. Reports that the chest pain started a week ago. 

Reports that pain is worse in the morning and improves as the day continues. 

Describes the pain as squeezing like in sensation. Denies pain radiation. Denies

numbness/tingling. Pt reports having similar pain a couple of years ago and was 

placed on a [employer] monitor. Does not think she had any abnormalities noted. 





Denies fever/chills. Denies cough. Denies SOB at rest. Denies abdominal 

pain/diarrhea/dysuria/LE swelling. 





Of note, patient had contact on March 5 (conversation at approx. 3 feet away) 

with a friend who later tested positive for COVID-19. Denies fever/cough. 

Positive shortness of breath on exertion. 














Past History





- Past Medical History


Allergies/Adverse Reactions: 


                                    Allergies











Allergy/AdvReac Type Severity Reaction Status Date / Time


 


latex AdvReac  Itching Verified 03/14/20 14:49











Home Medications: 


Ambulatory Orders





Amlodipine Besylate [Norvasc -] 10 mg PO DAILY 08/12/14 


Valsartan/Hydrochlorothiazide [Diovan Hct 160-25 mg Tablet] 1 each PO DAILY 

08/12/14 








Anemia: No


Asthma: No


Cancer: No


Cardiac Disorders: No


CVA: No


COPD: No


CHF: No


Dementia: No


Diabetes: No


GI Disorders: No


 Disorders: No


HTN: Yes


Hypercholesterolemia: Yes


Liver Disease: No


Seizures: No


Thyroid Disease: No





- Surgical History


Abdominal Surgery: No


Appendectomy: No


Cardiac Surgery: No


Cholecystectomy: Yes


Lung Surgery: No


Neurologic Surgery: No


Orthopedic Surgery: Yes (FX LEFT ANKLE; KNEE REPLACEMENT)





- Immunization History


Immunization Up to Date: Yes





- Psycho Social/Smoking Cessation Hx


Smoking History: Never smoked


Have you smoked in the past 12 months: No


Information on smoking cessation initiated: No


Hx Alcohol Use: No


Drug/Substance Use Hx: No


Substance Use Type: None


Hx Substance Use Treatment: No





Cardiac Specific PMH





- Complaint Specific PMHX


Pacemaker: No





**Review of Systems





- Review of Systems


Comments:: 





GENERAL/CONSTITUTIONAL: No fever or chills. No weakness._


HEAD, EYES, EARS, NOSE AND THROAT: No change in vision. No change in hearing. No

sore throat._


CARDIOVASCULAR: Reports mild chest pain and shortness of breath on exertion. 


RESPIRATORY: Denies cough, hemoptysis_


GASTROINTESTINAL: No nausea, vomiting, diarrhea or constipation._


GENITOURINARY: No dysuria, frequency, or change in urination._


MUSCULOSKELETAL: No joint or muscle swelling or pain. No neck or back pain._


SKIN: No rash_


NEUROLOGIC: No headache, vertigo, loss of consciousness, or change in 

strength/sensation._


ENDOCRINE: No increased thirst. No abnormal weight change_


HEMATOLOGIC/LYMPHATIC: No anemia, easy bleeding, or history of blood clots._


ALLERGIC/IMMUNOLOGIC: No hives or skin allergy._











*Physical Exam





- Vital Signs


                                Last Vital Signs











Temp Pulse Resp BP Pulse Ox


 


 98.3 F   68   18   122/78   100 


 


 03/14/20 19:17  03/14/20 19:17  03/14/20 19:17  03/14/20 19:17  03/14/20 19:17














- Physical Exam





GENERAL: Awake, alert, and oriented to person/place/time, in no acute distress_


HEAD: No signs of trauma, normocephalic, atraumatic _


EYES: PERRLA, EOMI, sclera anicteric, conjunctiva clear_


ENT: Hearing grossly normal, nares patent, oropharynx clear without exudates. No

uvular deviation. Moist mucosa_


NECK: Normal ROM, supple, no lymphadenopathy, JVD, or masses_


LUNGS: No distress, speaks in full sentences, clear to auscultation bilaterally 

_


HEART: Regular rate and rhythm, normal S1 and S2, no murmurs appreciated, 

peripheral pulses normal and equal bilaterally._


ABDOMEN: Soft, nontender, normoactive bowel sounds. No guarding, no rebound. No 

masses_


EXTREMITIES: Normal inspection, Normal range of motion, no edema. No clubbing or

cyanosis_


NEUROLOGICAL: Cranial nerves II through XII grossly intact. Normal speech, 

normal gait, no focal sensorimotor deficits _


SKIN: Warm, Dry, normal turgor, no rashes or lesions noted_











ED Treatment Course





- LABORATORY


CBC & Chemistry Diagram: 


                                 03/14/20 16:00





                                 03/14/20 16:00





- ADDITIONAL ORDERS


Additional order review: 


                               Laboratory  Results











  03/14/20 03/14/20 03/14/20





  19:08 16:00 16:00


 


D-Dimer   666 H 


 


Sodium    139


 


Potassium    4.0


 


Chloride    103


 


Carbon Dioxide    28


 


Anion Gap    8


 


BUN    11.4


 


Creatinine    0.7


 


Est GFR (CKD-EPI)AfAm    106.12


 


Est GFR (CKD-EPI)NonAf    91.56


 


Random Glucose    87


 


Calcium    9.4


 


Total Bilirubin    0.4


 


AST    18


 


ALT    19


 


Alkaline Phosphatase    101


 


Creatine Kinase    114


 


Troponin I  < 0.02   < 0.02


 


Total Protein    8.0


 


Albumin    3.7








                                        











  03/14/20





  16:00


 


RBC  4.86


 


MCV  79.5 L


 


MCHC  32.0


 


RDW  16.6 H


 


MPV  6.8 L


 


Neutrophils %  49.5


 


Lymphocytes %  38.3


 


Monocytes %  5.8


 


Eosinophils %  5.4 H D


 


Basophils %  1.0  D














Medical Decision Making





- Medical Decision Making


64F presenting mid sternal chest pain for the past 5 days. Dyspnea on exertion 

at the hospital today.


-cbc, cmp


-ekg, trop, cxr


-d dimer


-flu swab


-RSV swab


-COVID-19   





03/14/20 16:34


Call placed to [facility] ([phone]). Will call back 

only if patient is admitted. 





03/14/20 16:35


EKG shows sinus bradycardia, 57 bpm, no ST elevation/depression, QTc 389, no 

axis deviation. 





03/14/20 17:51


Labs reviewed.





                             Laboratory Last Values











WBC  7.4 K/mm3 (4.0-10.0)   03/14/20  16:00    


 


RBC  4.86 M/mm3 (3.60-5.2)   03/14/20  16:00    


 


Hgb  12.4 GM/dL (10.7-15.3)   03/14/20  16:00    


 


Hct  38.7 % (32.4-45.2)   03/14/20  16:00    


 


MCV  79.5 fl (80-96)  L  03/14/20  16:00    


 


MCH  25.5 pg (25.7-33.7)  L  03/14/20  16:00    


 


MCHC  32.0 g/dl (32.0-36.0)   03/14/20  16:00    


 


RDW  16.6 % (11.6-15.6)  H  03/14/20  16:00    


 


Plt Count  358 K/MM3 (134-434)   03/14/20  16:00    


 


MPV  6.8 fl (7.5-11.1)  L  03/14/20  16:00    


 


Absolute Neuts (auto)  3.7 K/mm3 (1.5-8.0)   03/14/20  16:00    


 


Neutrophils %  49.5 % (42.8-82.8)   03/14/20  16:00    


 


Lymphocytes %  38.3 % (8-40)   03/14/20  16:00    


 


Monocytes %  5.8 % (3.8-10.2)   03/14/20  16:00    


 


Eosinophils %  5.4 % (0-4.5)  H D 03/14/20  16:00    


 


Basophils %  1.0 % (0-2.0)  D 03/14/20  16:00    


 


Nucleated RBC %  0 % (0-0)   03/14/20  16:00    


 


D-Dimer  666 ng/ml (0-500)  H  03/14/20  16:00    


 


Sodium  139 mmol/L (136-145)   03/14/20  16:00    


 


Potassium  4.0 mmol/L (3.5-5.1)   03/14/20  16:00    


 


Chloride  103 mmol/L ()   03/14/20  16:00    


 


Carbon Dioxide  28 mmol/L (21-32)   03/14/20  16:00    


 


Anion Gap  8 MMOL/L (8-16)   03/14/20  16:00    


 


BUN  11.4 mg/dL (7-18)   03/14/20  16:00    


 


Creatinine  0.7 mg/dL (0.55-1.3)   03/14/20  16:00    


 


Est GFR (CKD-EPI)AfAm  106.12   03/14/20  16:00    


 


Est GFR (CKD-EPI)NonAf  91.56   03/14/20  16:00    


 


Random Glucose  87 mg/dL ()   03/14/20  16:00    


 


Calcium  9.4 mg/dL (8.5-10.1)   03/14/20  16:00    


 


Total Bilirubin  0.4 mg/dL (0.2-1)   03/14/20  16:00    


 


AST  18 U/L (15-37)   03/14/20  16:00    


 


ALT  19 U/L (13-61)   03/14/20  16:00    


 


Alkaline Phosphatase  101 U/L ()   03/14/20  16:00    


 


Creatine Kinase  114 U/L ()   03/14/20  16:00    


 


Troponin I  < 0.02 ng/ml (0.00-0.05)   03/14/20  16:00    


 


Total Protein  8.0 g/dl (6.4-8.2)   03/14/20  16:00    


 


Albumin  3.7 g/dl (3.4-5.0)   03/14/20  16:00    


 


Influenza A (Rapid)  Negative  (Negative)   03/14/20  16:00    


 


Influenza B (Rapid)  Negative  (Negative)   03/14/20  16:00    


 


RSV Rapid  Negative  (Negative)   03/14/20  16:00    








D-Dimer is mildly elevated, as this is an acute phase reactant elevated in viral

infections. Pt is resting comfortably and breathing with ease at rest. No 

shortness of breath at rest. Saturating well on room air. Very low suspicion for

PE. Wells Score 0.





03/14/20 19:00


CXR negative for acute chest pathology.


Pt signed out to Dr. Carreon pending 2nd trop. 








Discharge





- Discharge Information


Problems reviewed: Yes


Clinical Impression/Diagnosis: 


Chest pain


Qualifiers:


 Chest pain type: unspecified Qualified Code(s): R07.9 - Chest pain, unspecified





Condition: Good


Disposition: HOME





- Admission


No





- Follow up/Referral


Referrals: 


Caterina Carrizales MD [Primary Care Provider] - 


Gabo Stark MD [Staff Physician] - 





- Patient Discharge Instructions


Patient Printed Discharge Instructions:  DI for Atypical Chest Pain, DI for Ch

est Pain


Additional Instructions: 


Your EKG, labs, chest x-ray were within normal limits. 





Because you have been in contact with someone with known COVID-19, we encourage 

you to self quarantine for 14 days since the contact for the safety of yourself 

and those around you.





Please make a follow up appointment with a cardiologist (referral provided 

here).





If you experience any new, worsening, or concerning symptoms, including severe 

chest pain, nausea, vomiting, shortness of breath, cough, fever, or any other 

concerns, please return to the emergency room. 





- Post Discharge Activity

## 2020-03-14 NOTE — PDOC
*Physical Exam





- Vital Signs


                                Last Vital Signs











Temp Pulse Resp BP Pulse Ox


 


 97.9 F   61   18   138/69   98 


 


 03/14/20 14:43  03/14/20 14:43  03/14/20 14:43  03/14/20 14:43  03/14/20 14:43














ED Treatment Course





- LABORATORY


CBC & Chemistry Diagram: 


                                 03/14/20 16:00





                                 03/14/20 16:00





- ADDITIONAL ORDERS


Additional order review: 


                               Laboratory  Results











  03/14/20 03/14/20





  16:00 16:00


 


D-Dimer  666 H 


 


Sodium   139


 


Potassium   4.0


 


Chloride   103


 


Carbon Dioxide   28


 


Anion Gap   8


 


BUN   11.4


 


Creatinine   0.7


 


Est GFR (CKD-EPI)AfAm   106.12


 


Est GFR (CKD-EPI)NonAf   91.56


 


Random Glucose   87


 


Calcium   9.4


 


Total Bilirubin   0.4


 


AST   18


 


ALT   19


 


Alkaline Phosphatase   101


 


Creatine Kinase   114


 


Troponin I   < 0.02


 


Total Protein   8.0


 


Albumin   3.7








                                        











  03/14/20





  16:00


 


RBC  4.86


 


MCV  79.5 L


 


MCHC  32.0


 


RDW  16.6 H


 


MPV  6.8 L


 


Neutrophils %  49.5


 


Lymphocytes %  38.3


 


Monocytes %  5.8


 


Eosinophils %  5.4 H D


 


Basophils %  1.0  D














Medical Decision Making





- Medical Decision Making


Pt was signed out to me by resident Dr. Cox, who explained the presentation, ED

course, any pending results, and needed interventions. Pending results include 

second troponin. Pt is currently stable and is lying comfortably. Vitals stable.




03/14/20 19:13





Second troponin negative


Pt knows to be self-quarantined, pending results of covid testing. Strict return

precautions provided with pt understanding. 


03/14/20 19:59








Discharge





- Discharge Information


Problems reviewed: Yes


Clinical Impression/Diagnosis: 


Chest pain


Qualifiers:


 Chest pain type: unspecified Qualified Code(s): R07.9 - Chest pain, unspecified





Condition: Good


Disposition: HOME





- Admission


No





- Follow up/Referral


Referrals: 


Caterina Carrizales MD [Primary Care Provider] - 


Gabo Stark MD [Staff Physician] - 





- Patient Discharge Instructions


Patient Printed Discharge Instructions:  DI for Atypical Chest Pain, DI for 

Chest Pain


Additional Instructions: 


Your EKG, labs, chest x-ray were within normal limits. 





Because you have been in contact with someone with known COVID-19, we encourage 

you to self quarantine for 14 days since the contact for the safety of yourself 

and those around you.





Please make a follow up appointment with a cardiologist (referral provided 

here).





If you experience any new, worsening, or concerning symptoms, including severe 

chest pain, nausea, vomiting, shortness of breath, cough, fever, or any other 

concerns, please return to the emergency room. 





- Post Discharge Activity

## 2020-03-14 NOTE — PDOC
History of Present Illness





- General


Chief Complaint: Chest Pain


Stated Complaint: CHEST PAIN


Time Seen by Provider: 03/14/20 15:28


History Source: Patient


Exam Limitations: No Limitations





- History of Present Illness


Initial Comments: 





03/14/20 15:28


Carolyn Griffin is a 64F presenting with chest pain.





Past History





- Past Medical History


Allergies/Adverse Reactions: 


                                    Allergies











Allergy/AdvReac Type Severity Reaction Status Date / Time


 


latex AdvReac  Itching Verified 03/14/20 14:49











Home Medications: 


Ambulatory Orders





Amlodipine Besylate [Norvasc -] 10 mg PO DAILY 08/12/14 


Valsartan/Hydrochlorothiazide [Diovan Hct 160-25 mg Tablet] 1 each PO DAILY 

08/12/14 


Hydrocodone/Acetaminophen [Vicodin 5-300 mg Tablet] 1 - 2 tab PO TID PRN #20 

tablet MDD 6 01/17/18 








Anemia: No


Asthma: No


Cancer: No


Cardiac Disorders: No


CVA: No


COPD: No


CHF: No


Dementia: No


Diabetes: No


GI Disorders: No


 Disorders: No


HTN: Yes


Hypercholesterolemia: Yes


Liver Disease: No


Seizures: No


Thyroid Disease: No





- Surgical History


Abdominal Surgery: No


Appendectomy: No


Cardiac Surgery: No


Cholecystectomy: Yes


Lung Surgery: No


Neurologic Surgery: No


Orthopedic Surgery: Yes (FX LEFT ANKLE; KNEE REPLACEMENT)





- Immunization History


Immunization Up to Date: Yes





- Psycho Social/Smoking Cessation Hx


Smoking History: Never smoked


Have you smoked in the past 12 months: No


Information on smoking cessation initiated: No


Hx Alcohol Use: No


Drug/Substance Use Hx: No


Substance Use Type: None


Hx Substance Use Treatment: No





*Physical Exam





- Vital Signs


                                Last Vital Signs











Temp Pulse Resp BP Pulse Ox


 


 97.9 F   61   18   138/69   98 


 


 03/14/20 14:43  03/14/20 14:43  03/14/20 14:43  03/14/20 14:43  03/14/20 14:43














Discharge





- Follow up/Referral


Referrals: 


Caterina Carrizales MD [Primary Care Provider] - 





- Patient Discharge Instructions





- Post Discharge Activity

## 2020-03-14 NOTE — EKG
Test Reason : 

Blood Pressure : ***/*** mmHG

Vent. Rate : 057 BPM     Atrial Rate : 057 BPM

   P-R Int : 170 ms          QRS Dur : 088 ms

    QT Int : 400 ms       P-R-T Axes : 036 -15 012 degrees

   QTc Int : 389 ms

 

SINUS BRADYCARDIA

POSSIBLE LEFT ATRIAL ENLARGEMENT

NONSPECIFIC ST AND T WAVE ABNORMALITY

ABNORMAL ECG

WHEN COMPARED WITH ECG OF 01-APR-2014 13:39,

NO SIGNIFICANT CHANGE WAS FOUND

Confirmed by MD Duran, Jesse ([id number]) on 3/14/2020 3:48:14 PM

 

Referred By:             Confirmed By:Jesse Garzon MD

## 2020-11-18 ENCOUNTER — HOSPITAL ENCOUNTER (OUTPATIENT)
Dept: HOSPITAL 74 - JASU-SURG | Age: 65
Discharge: HOME | End: 2020-11-18
Attending: UROLOGY
Payer: COMMERCIAL

## 2020-11-18 VITALS — BODY MASS INDEX: 37.5 KG/M2

## 2020-11-18 VITALS — TEMPERATURE: 97 F | HEART RATE: 54 BPM | DIASTOLIC BLOOD PRESSURE: 84 MMHG | SYSTOLIC BLOOD PRESSURE: 143 MMHG

## 2020-11-18 DIAGNOSIS — R31.9: ICD-10-CM

## 2020-11-18 DIAGNOSIS — N23: Primary | ICD-10-CM

## 2020-11-18 PROCEDURE — 0T9680Z DRAINAGE OF RIGHT URETER WITH DRAINAGE DEVICE, VIA NATURAL OR ARTIFICIAL OPENING ENDOSCOPIC: ICD-10-PCS | Performed by: UROLOGY

## 2021-07-29 ENCOUNTER — HOSPITAL ENCOUNTER (EMERGENCY)
Dept: HOSPITAL 74 - JER | Age: 66
Discharge: HOME | End: 2021-07-29
Payer: COMMERCIAL

## 2021-07-29 VITALS — DIASTOLIC BLOOD PRESSURE: 73 MMHG | TEMPERATURE: 97.1 F | SYSTOLIC BLOOD PRESSURE: 116 MMHG | HEART RATE: 70 BPM

## 2021-07-29 VITALS — BODY MASS INDEX: 37.1 KG/M2

## 2021-07-29 DIAGNOSIS — M24.851: Primary | ICD-10-CM

## 2021-07-29 PROCEDURE — 3E0233Z INTRODUCTION OF ANTI-INFLAMMATORY INTO MUSCLE, PERCUTANEOUS APPROACH: ICD-10-PCS

## 2021-09-16 ENCOUNTER — HOSPITAL ENCOUNTER (EMERGENCY)
Dept: HOSPITAL 74 - JERFT | Age: 66
Discharge: HOME | End: 2021-09-16
Payer: COMMERCIAL

## 2021-09-16 VITALS — DIASTOLIC BLOOD PRESSURE: 71 MMHG | TEMPERATURE: 98.1 F | HEART RATE: 70 BPM | SYSTOLIC BLOOD PRESSURE: 125 MMHG

## 2021-09-16 VITALS — BODY MASS INDEX: 36.9 KG/M2

## 2021-09-16 DIAGNOSIS — L02.412: Primary | ICD-10-CM

## 2021-09-16 PROCEDURE — 0X950ZX DRAINAGE OF LEFT AXILLA, OPEN APPROACH, DIAGNOSTIC: ICD-10-PCS

## 2021-09-18 ENCOUNTER — HOSPITAL ENCOUNTER (EMERGENCY)
Dept: HOSPITAL 74 - JERFT | Age: 66
Discharge: HOME | End: 2021-09-18
Payer: COMMERCIAL

## 2021-09-18 VITALS — TEMPERATURE: 98 F | DIASTOLIC BLOOD PRESSURE: 77 MMHG | SYSTOLIC BLOOD PRESSURE: 126 MMHG | HEART RATE: 66 BPM

## 2021-09-18 VITALS — BODY MASS INDEX: 36.9 KG/M2

## 2021-09-18 DIAGNOSIS — Z48.00: Primary | ICD-10-CM
